# Patient Record
Sex: MALE | Race: WHITE | NOT HISPANIC OR LATINO | URBAN - METROPOLITAN AREA
[De-identification: names, ages, dates, MRNs, and addresses within clinical notes are randomized per-mention and may not be internally consistent; named-entity substitution may affect disease eponyms.]

---

## 2018-07-09 ENCOUNTER — OUTPATIENT (OUTPATIENT)
Dept: OUTPATIENT SERVICES | Facility: HOSPITAL | Age: 73
LOS: 1 days | End: 2018-07-09

## 2018-07-16 ENCOUNTER — OUTPATIENT (OUTPATIENT)
Dept: OUTPATIENT SERVICES | Facility: HOSPITAL | Age: 73
LOS: 1 days | End: 2018-07-16

## 2018-07-16 ENCOUNTER — INPATIENT (INPATIENT)
Facility: HOSPITAL | Age: 73
LOS: 1 days | Discharge: HOME | End: 2018-07-18
Payer: MEDICARE

## 2018-07-16 PROCEDURE — 72170 X-RAY EXAM OF PELVIS: CPT | Mod: 26

## 2018-07-16 PROCEDURE — 72170 X-RAY EXAM OF PELVIS: CPT | Mod: 26,77

## 2018-07-17 ENCOUNTER — OUTPATIENT (OUTPATIENT)
Dept: OUTPATIENT SERVICES | Facility: HOSPITAL | Age: 73
LOS: 1 days | End: 2018-07-17

## 2018-07-18 ENCOUNTER — OUTPATIENT (OUTPATIENT)
Dept: OUTPATIENT SERVICES | Facility: HOSPITAL | Age: 73
LOS: 1 days | End: 2018-07-18

## 2021-03-22 ENCOUNTER — OUTPATIENT (OUTPATIENT)
Dept: OUTPATIENT SERVICES | Facility: HOSPITAL | Age: 76
LOS: 1 days | End: 2021-03-22
Payer: MEDICARE

## 2021-03-22 PROCEDURE — 93010 ELECTROCARDIOGRAM REPORT: CPT

## 2021-04-02 ENCOUNTER — APPOINTMENT (OUTPATIENT)
Dept: DISASTER EMERGENCY | Facility: CLINIC | Age: 76
End: 2021-04-02

## 2021-04-02 DIAGNOSIS — Z01.818 ENCOUNTER FOR OTHER PREPROCEDURAL EXAMINATION: ICD-10-CM

## 2021-04-02 PROBLEM — Z00.00 ENCOUNTER FOR PREVENTIVE HEALTH EXAMINATION: Status: ACTIVE | Noted: 2021-04-02

## 2021-04-03 LAB — SARS-COV-2 N GENE NPH QL NAA+PROBE: NOT DETECTED

## 2021-04-05 ENCOUNTER — INPATIENT (INPATIENT)
Facility: HOSPITAL | Age: 76
LOS: 1 days | Discharge: HOME HEALTH | End: 2021-04-07

## 2021-04-05 ENCOUNTER — OUTPATIENT (OUTPATIENT)
Dept: OUTPATIENT SERVICES | Facility: HOSPITAL | Age: 76
LOS: 1 days | End: 2021-04-05

## 2021-04-06 ENCOUNTER — OUTPATIENT (OUTPATIENT)
Dept: OUTPATIENT SERVICES | Facility: HOSPITAL | Age: 76
LOS: 1 days | End: 2021-04-06

## 2021-04-07 ENCOUNTER — OUTPATIENT (OUTPATIENT)
Dept: OUTPATIENT SERVICES | Facility: HOSPITAL | Age: 76
LOS: 1 days | End: 2021-04-07

## 2023-09-15 ASSESSMENT — HOOS JR
HOOS JR RAW SCORE: 6
IMPORTED LATERALITY: RIGHT
GOING UP OR DOWN STAIRS: MILD
IMPORTED HOOS JR SCORE: 70.43
HOOS JR RAW SCORE: 6
GOING UP OR DOWN STAIRS: MILD
LYING IN BED (TURNING OVER, MAINTAINING HIP POSITION): MILD
RISING FROM SITTING: MILD
WALKING ON UNEVEN SURFACE: MODERATE
WALKING ON UNEVEN SURFACE: MODERATE
GOING UP OR DOWN STAIRS: MILD
RISING FROM SITTING: MILD
IMPORTED HOOS JR SCORE: 85.26
BENDING TO THE FLOOR TO PICK UP OBJECT: MILD
IMPORTED FORM: YES
WALKING ON UNEVEN SURFACE: MILD
IMPORTED HOOS JR SCORE: 70.43
LYING IN BED (TURNING OVER, MAINTAINING HIP POSITION): MILD
BENDING TO THE FLOOR TO PICK UP OBJECT: MILD
IMPORTED FORM: YES
WALKING ON UNEVEN SURFACE: MILD
IMPORTED FORM: YES
IMPORTED HOOS JR SCORE: 85.26
HOOS JR RAW SCORE: 2
HOOS JR RAW SCORE: 2
IMPORTED FORM: YES
IMPORTED LATERALITY: RIGHT
GOING UP OR DOWN STAIRS: MILD
IMPORTED LATERALITY: RIGHT
IMPORTED LATERALITY: RIGHT

## 2024-01-24 ENCOUNTER — OFFICE (OUTPATIENT)
Dept: URBAN - METROPOLITAN AREA CLINIC 8 | Facility: CLINIC | Age: 79
Setting detail: OPHTHALMOLOGY
End: 2024-01-24
Payer: MEDICARE

## 2024-01-24 DIAGNOSIS — H40.1131: ICD-10-CM

## 2024-01-24 DIAGNOSIS — H35.361: ICD-10-CM

## 2024-01-24 DIAGNOSIS — H11.153: ICD-10-CM

## 2024-01-24 DIAGNOSIS — H25.13: ICD-10-CM

## 2024-01-24 PROCEDURE — 92134 CPTRZ OPH DX IMG PST SGM RTA: CPT | Performed by: OPHTHALMOLOGY

## 2024-01-24 PROCEDURE — 99214 OFFICE O/P EST MOD 30 MIN: CPT | Performed by: OPHTHALMOLOGY

## 2024-01-24 ASSESSMENT — REFRACTION_CURRENTRX
OS_VPRISM_DIRECTION: SV
OS_OVR_VA: 20/
OD_CYLINDER: -2.50
OS_AXIS: 068
OD_OVR_VA: 20/
OD_OVR_VA: 20/
OS_SPHERE: -2.50
OD_AXIS: 094
OS_CYLINDER: -1.00
OS_OVR_VA: 20/
OD_VPRISM_DIRECTION: PROGS
OD_AXIS: 096
OS_SPHERE: -3.00
OS_AXIS: 085
OD_SPHERE: -1.50
OD_VPRISM_DIRECTION: SV
OD_CYLINDER: -1.25
OS_ADD: +2.75
OS_CYLINDER: -1.25
OD_ADD: +2.25
OD_SPHERE: -0.75
OS_VPRISM_DIRECTION: PROGS

## 2024-01-24 ASSESSMENT — REFRACTION_MANIFEST
OS_ADD: +2.50
OU_VA: 20/40
OD_AXIS: 100
OD_CYLINDER: -2.50
OD_SPHERE: -0.75
OD_VA1: 20/40
OS_SPHERE: -2.50
OD_ADD: +2.50
OD_VA2: 20/20(J1+)
OS_CYLINDER: -1.25
OS_VA2: 20/20(J1+)
OS_VA1: 20/40
OS_AXIS: 075

## 2024-01-24 ASSESSMENT — REFRACTION_AUTOREFRACTION
OD_SPHERE: PLANO
OS_SPHERE: -2.25
OS_AXIS: 064
OS_CYLINDER: -1.00
OD_CYLINDER: -2.25
OD_AXIS: 096

## 2024-01-24 ASSESSMENT — CONFRONTATIONAL VISUAL FIELD TEST (CVF)
OS_FINDINGS: FULL
OD_FINDINGS: FULL

## 2024-01-24 ASSESSMENT — LID POSITION - DERMATOCHALASIS
OD_DERMATOCHALASIS: T
OS_DERMATOCHALASIS: T

## 2024-01-24 ASSESSMENT — SPHEQUIV_DERIVED
OS_SPHEQUIV: -2.75
OS_SPHEQUIV: -3.125
OD_SPHEQUIV: -2

## 2024-02-26 ENCOUNTER — OFFICE (OUTPATIENT)
Dept: URBAN - METROPOLITAN AREA CLINIC 8 | Facility: CLINIC | Age: 79
Setting detail: OPHTHALMOLOGY
End: 2024-02-26
Payer: MEDICARE

## 2024-02-26 DIAGNOSIS — H25.13: ICD-10-CM

## 2024-02-26 DIAGNOSIS — H25.11: ICD-10-CM

## 2024-02-26 PROCEDURE — 92136 OPHTHALMIC BIOMETRY: CPT | Mod: TC | Performed by: OPHTHALMOLOGY

## 2024-02-26 PROCEDURE — 92136 OPHTHALMIC BIOMETRY: CPT | Mod: 26,RT | Performed by: OPHTHALMOLOGY

## 2024-02-26 PROCEDURE — 99213 OFFICE O/P EST LOW 20 MIN: CPT | Performed by: OPHTHALMOLOGY

## 2024-02-26 ASSESSMENT — SPHEQUIV_DERIVED
OD_SPHEQUIV: -2
OS_SPHEQUIV: -2.75
OS_SPHEQUIV: -3.125

## 2024-02-26 ASSESSMENT — REFRACTION_CURRENTRX
OD_AXIS: 096
OD_CYLINDER: -2.50
OD_SPHERE: -1.50
OS_AXIS: 085
OD_CYLINDER: -1.25
OS_AXIS: 068
OS_CYLINDER: -1.00
OS_SPHERE: -3.00
OS_OVR_VA: 20/
OS_SPHERE: -2.50
OS_OVR_VA: 20/
OD_VPRISM_DIRECTION: PROGS
OS_CYLINDER: -1.25
OS_VPRISM_DIRECTION: PROGS
OD_AXIS: 094
OD_OVR_VA: 20/
OD_SPHERE: -0.75
OS_VPRISM_DIRECTION: SV
OD_OVR_VA: 20/
OD_ADD: +2.25
OD_VPRISM_DIRECTION: SV
OS_ADD: +2.75

## 2024-02-26 ASSESSMENT — REFRACTION_MANIFEST
OD_CYLINDER: -2.50
OU_VA: 20/40
OS_CYLINDER: -1.25
OS_AXIS: 075
OS_VA2: 20/20(J1+)
OS_ADD: +2.50
OS_SPHERE: -2.50
OS_VA1: 20/40
OD_VA2: 20/20(J1+)
OD_VA1: 20/40
OD_ADD: +2.50
OD_AXIS: 100
OD_SPHERE: -0.75

## 2024-02-26 ASSESSMENT — REFRACTION_AUTOREFRACTION
OS_AXIS: 064
OS_CYLINDER: -1.00
OD_AXIS: 096
OD_SPHERE: PLANO
OD_CYLINDER: -2.25
OS_SPHERE: -2.25

## 2024-02-26 ASSESSMENT — LID POSITION - DERMATOCHALASIS
OD_DERMATOCHALASIS: T
OS_DERMATOCHALASIS: T

## 2024-02-26 ASSESSMENT — CONFRONTATIONAL VISUAL FIELD TEST (CVF)
OD_FINDINGS: FULL
OS_FINDINGS: FULL

## 2024-04-09 ENCOUNTER — AMBULATORY SURGERY CENTER (OUTPATIENT)
Dept: URBAN - METROPOLITAN AREA SURGERY 4 | Facility: SURGERY | Age: 79
Setting detail: OPHTHALMOLOGY
End: 2024-04-09
Payer: MEDICARE

## 2024-04-09 DIAGNOSIS — H25.11: ICD-10-CM

## 2024-04-09 DIAGNOSIS — H52.211: ICD-10-CM

## 2024-04-09 DIAGNOSIS — H40.1131: ICD-10-CM

## 2024-04-09 PROCEDURE — V2788LAL LIGHT ADJUSTABLE LENS (LAL): Performed by: OPHTHALMOLOGY

## 2024-04-09 PROCEDURE — 66991 XCAPSL CTRC RMVL INSJ 1+: CPT | Mod: 54,RT | Performed by: OPHTHALMOLOGY

## 2024-04-09 PROCEDURE — A9270 NON-COVERED ITEM OR SERVICE: HCPCS | Mod: GY | Performed by: OPHTHALMOLOGY

## 2024-04-09 PROCEDURE — FEMTO FEMTOSECOND LASER: Mod: GY | Performed by: OPHTHALMOLOGY

## 2024-04-10 ENCOUNTER — RX ONLY (RX ONLY)
Age: 79
End: 2024-04-10

## 2024-04-10 ENCOUNTER — OFFICE (OUTPATIENT)
Dept: URBAN - METROPOLITAN AREA CLINIC 8 | Facility: CLINIC | Age: 79
Setting detail: OPHTHALMOLOGY
End: 2024-04-10
Payer: MEDICARE

## 2024-04-10 DIAGNOSIS — H25.12: ICD-10-CM

## 2024-04-10 PROCEDURE — 92136 OPHTHALMIC BIOMETRY: CPT | Mod: 26,LT | Performed by: OPHTHALMOLOGY

## 2024-04-10 ASSESSMENT — LID POSITION - DERMATOCHALASIS
OS_DERMATOCHALASIS: T
OD_DERMATOCHALASIS: T

## 2024-04-16 ENCOUNTER — OUTPATIENT HOSPITAL (OUTPATIENT)
Dept: URBAN - METROPOLITAN AREA CLINIC 7 | Facility: CLINIC | Age: 79
Setting detail: OPHTHALMOLOGY
End: 2024-04-16
Payer: MEDICARE

## 2024-04-16 DIAGNOSIS — H52.212: ICD-10-CM

## 2024-04-16 DIAGNOSIS — H40.1131: ICD-10-CM

## 2024-04-16 DIAGNOSIS — H25.12: ICD-10-CM

## 2024-04-16 PROCEDURE — FEMTO FEMTOSECOND LASER: Mod: GY | Performed by: OPHTHALMOLOGY

## 2024-04-16 PROCEDURE — V2788LAL LIGHT ADJUSTABLE LENS (LAL): Performed by: OPHTHALMOLOGY

## 2024-04-16 PROCEDURE — 66991 XCAPSL CTRC RMVL INSJ 1+: CPT | Mod: 54,79,LT | Performed by: OPHTHALMOLOGY

## 2024-04-16 PROCEDURE — A9270 NON-COVERED ITEM OR SERVICE: HCPCS | Mod: GY | Performed by: OPHTHALMOLOGY

## 2024-04-17 ENCOUNTER — OFFICE (OUTPATIENT)
Dept: URBAN - METROPOLITAN AREA CLINIC 8 | Facility: CLINIC | Age: 79
Setting detail: OPHTHALMOLOGY
End: 2024-04-17
Payer: MEDICARE

## 2024-04-17 DIAGNOSIS — Z96.1: ICD-10-CM

## 2024-04-17 DIAGNOSIS — H11.32: ICD-10-CM

## 2024-04-17 DIAGNOSIS — H40.1131: ICD-10-CM

## 2024-04-17 PROCEDURE — 99024 POSTOP FOLLOW-UP VISIT: CPT | Performed by: OPHTHALMOLOGY

## 2024-04-17 ASSESSMENT — LID POSITION - DERMATOCHALASIS
OD_DERMATOCHALASIS: T
OS_DERMATOCHALASIS: T

## 2024-09-06 ENCOUNTER — APPOINTMENT (OUTPATIENT)
Dept: ORTHOPEDIC SURGERY | Facility: CLINIC | Age: 79
End: 2024-09-06
Payer: MEDICARE

## 2024-09-06 DIAGNOSIS — S83.242A OTHER TEAR OF MEDIAL MENISCUS, CURRENT INJURY, LEFT KNEE, INITIAL ENCOUNTER: ICD-10-CM

## 2024-09-06 PROCEDURE — 99203 OFFICE O/P NEW LOW 30 MIN: CPT

## 2024-09-06 RX ORDER — SIMVASTATIN 5 MG/1
5 TABLET, FILM COATED ORAL
Refills: 0 | Status: ACTIVE | COMMUNITY

## 2024-09-06 RX ORDER — VIT B12/LEVOMEFOLATE/VIT B6/B2 1-6-50-5MG
TABLET ORAL
Refills: 0 | Status: ACTIVE | COMMUNITY

## 2024-09-06 RX ORDER — SERTRALINE 25 MG/1
TABLET, FILM COATED ORAL
Refills: 0 | Status: ACTIVE | COMMUNITY

## 2024-09-06 NOTE — PHYSICAL EXAM
[Left] : left knee [NL (0)] : extension 0 degrees [5___] : hamstring 5[unfilled]/5 [Negative] : negative Marsha's [] : patient ambulates without assistive device [AP] : anteroposterior [Lateral] : lateral [[place]] : skyline [Outside films reviewed] : Outside films reviewed [Degenerative change] : Degenerative change [TWNoteComboBox7] : flexion 120 degrees

## 2024-09-06 NOTE — HISTORY OF PRESENT ILLNESS
[de-identified] :  Patient presents for evaluation on LT knee pain. Patient reports knee pain starting around April without known trauma.  He saw his PCP in June who ordered XRs and prescribed PT.  Given continued swelling and pain, he returned to PCP who ordered MRI in August and diagnosed him with meniscus tear, advised seeing orthopedist.  Patient is taking Advil as needed with relief.

## 2024-09-06 NOTE — DISCUSSION/SUMMARY
[de-identified] : I reviewed patient's left knee radiographs and MRI and discussed his condition and treatment options.  Defer injection.  Start PT and HEP.  Follow up in 4 weeks.  Patient voiced understanding and agreement with the plan.

## 2024-10-02 ENCOUNTER — OFFICE (OUTPATIENT)
Dept: URBAN - METROPOLITAN AREA CLINIC 8 | Facility: CLINIC | Age: 79
Setting detail: OPHTHALMOLOGY
End: 2024-10-02
Payer: MEDICARE

## 2024-10-02 ENCOUNTER — APPOINTMENT (OUTPATIENT)
Dept: ORTHOPEDIC SURGERY | Facility: CLINIC | Age: 79
End: 2024-10-02
Payer: MEDICARE

## 2024-10-02 DIAGNOSIS — Z96.1: ICD-10-CM

## 2024-10-02 DIAGNOSIS — H35.3131: ICD-10-CM

## 2024-10-02 DIAGNOSIS — H52.4: ICD-10-CM

## 2024-10-02 DIAGNOSIS — H40.1131: ICD-10-CM

## 2024-10-02 DIAGNOSIS — H26.492: ICD-10-CM

## 2024-10-02 DIAGNOSIS — S83.242A OTHER TEAR OF MEDIAL MENISCUS, CURRENT INJURY, LEFT KNEE, INITIAL ENCOUNTER: ICD-10-CM

## 2024-10-02 DIAGNOSIS — H11.153: ICD-10-CM

## 2024-10-02 PROCEDURE — 99212 OFFICE O/P EST SF 10 MIN: CPT

## 2024-10-02 PROCEDURE — 92133 CPTRZD OPH DX IMG PST SGM ON: CPT | Performed by: OPHTHALMOLOGY

## 2024-10-02 PROCEDURE — 99213 OFFICE O/P EST LOW 20 MIN: CPT | Performed by: OPHTHALMOLOGY

## 2024-10-02 PROCEDURE — 92015 DETERMINE REFRACTIVE STATE: CPT | Performed by: OPHTHALMOLOGY

## 2024-10-02 ASSESSMENT — REFRACTION_MANIFEST
OS_SPHERE: -2.50
OS_AXIS: 075
OD_VA2: 20/20(J1+)
OU_VA: 20/40
OD_VA1: 20/40
OS_SPHERE: -1.25
OS_VA2: 20/20(J1+)
OS_ADD: +2.50
OS_ADD: +1.75
OD_CYLINDER: -2.50
OD_SPHERE: -0.25
OS_VA1: 20/40
OS_CYLINDER: -1.25
OD_AXIS: 100
OD_ADD: +2.50
OD_SPHERE: -0.75
OD_ADD: +1.75
OS_CYLINDER: SPH
OD_CYLINDER: SPH

## 2024-10-02 ASSESSMENT — REFRACTION_CURRENTRX
OD_CYLINDER: -2.50
OS_OVR_VA: 20/
OS_VPRISM_DIRECTION: PROGS
OD_SPHERE: -1.50
OD_OVR_VA: 20/
OS_SPHERE: -2.50
OS_AXIS: 085
OS_CYLINDER: -1.25
OS_CYLINDER: -1.00
OS_AXIS: 068
OD_VPRISM_DIRECTION: SV
OD_AXIS: 096
OD_VPRISM_DIRECTION: PROGS
OS_VPRISM_DIRECTION: SV
OD_SPHERE: -0.75
OD_OVR_VA: 20/
OD_ADD: +2.25
OS_SPHERE: -3.00
OD_AXIS: 094
OS_ADD: +2.75
OD_CYLINDER: -1.25
OS_OVR_VA: 20/

## 2024-10-02 ASSESSMENT — REFRACTION_AUTOREFRACTION
OD_AXIS: 127
OS_SPHERE: -1.50
OS_AXIS: 072
OD_SPHERE: 0.00
OD_CYLINDER: -0.25
OS_CYLINDER: -0.25

## 2024-10-02 ASSESSMENT — VISUAL ACUITY
OS_BCVA: 20/25-2
OD_BCVA: 20/40-2

## 2024-10-02 ASSESSMENT — KERATOMETRY
OD_K1POWER_DIOPTERS: 42.75
METHOD_AUTO_MANUAL: AUTO
OD_AXISANGLE_DEGREES: 090
OS_AXISANGLE_DEGREES: 093
OS_K1POWER_DIOPTERS: 42.75
OS_K2POWER_DIOPTERS: 43.25
OD_K2POWER_DIOPTERS: 42.75

## 2024-10-02 ASSESSMENT — PACHYMETRY
OD_CT_CORRECTION: 4
OD_CT_UM: 499
OS_CT_UM: 494
OS_CT_CORRECTION: 4

## 2024-10-02 ASSESSMENT — LID POSITION - DERMATOCHALASIS
OD_DERMATOCHALASIS: T
OS_DERMATOCHALASIS: T

## 2024-10-02 ASSESSMENT — CONFRONTATIONAL VISUAL FIELD TEST (CVF)
OD_FINDINGS: FULL
OS_FINDINGS: FULL

## 2024-10-02 ASSESSMENT — TONOMETRY
OS_IOP_MMHG: 13
OD_IOP_MMHG: 13

## 2024-10-02 NOTE — PHYSICAL EXAM
[Left] : left knee [NL (0)] : extension 0 degrees [5___] : hamstring 5[unfilled]/5 [Negative] : negative Marsha's [] : patient ambulates without assistive device [AP] : anteroposterior [Lateral] : lateral [[place]] : skyline [Outside films reviewed] : Outside films reviewed [Degenerative change] : Degenerative change [TWNoteComboBox7] : flexion 120 degrees

## 2024-10-02 NOTE — DISCUSSION/SUMMARY
[de-identified] : I discussed his condition and treatment course.  Continue PT and HEP.  Defer injection.  Follow up as needed.  Patient voiced understanding and agreement with the plan.

## 2025-04-09 ENCOUNTER — OFFICE (OUTPATIENT)
Dept: URBAN - METROPOLITAN AREA CLINIC 8 | Facility: CLINIC | Age: 80
Setting detail: OPHTHALMOLOGY
End: 2025-04-09
Payer: MEDICARE

## 2025-04-09 ENCOUNTER — RX ONLY (RX ONLY)
Age: 80
End: 2025-04-09

## 2025-04-09 DIAGNOSIS — H26.492: ICD-10-CM

## 2025-04-09 PROCEDURE — 66821 AFTER CATARACT LASER SURGERY: CPT | Mod: LT | Performed by: OPHTHALMOLOGY

## 2025-04-09 ASSESSMENT — REFRACTION_MANIFEST
OU_VA: 20/40
OD_VA2: 20/20(J1+)
OD_AXIS: 128
OS_ADD: +1.75
OS_CYLINDER: SPH
OD_CYLINDER: -0.25
OS_CYLINDER: SPH
OS_VA1: 20/40
OS_AXIS: `
OD_CYLINDER: SPH
OS_ADD: +1.75
OD_SPHERE: -0.25
OD_ADD: +1.75
OD_ADD: +1.75
OS_VA1: 20/40+
OS_SPHERE: -1.25
OS_SPHERE: -1.75
OD_SPHERE: -0.25
OS_VA2: 20/20(J1+)
OD_VA1: 20/25

## 2025-04-09 ASSESSMENT — REFRACTION_CURRENTRX
OS_SPHERE: +0.50
OS_VPRISM_DIRECTION: PROGS
OD_OVR_VA: 20/
OS_OVR_VA: 20/
OD_OVR_VA: 20/
OD_OVR_VA: 20/
OS_CYLINDER: -1.25
OS_AXIS: 085
OS_SPHERE: -2.50
OD_CYLINDER: -1.25
OD_SPHERE: +1.50
OS_VPRISM_DIRECTION: SV
OS_SPHERE: -3.00
OD_SPHERE: -1.50
OD_VPRISM_DIRECTION: PROGS
OD_AXIS: 096
OD_VPRISM_DIRECTION: SV
OD_CYLINDER: -2.50
OD_ADD: +2.25
OD_CYLINDER: SPH
OD_VPRISM_DIRECTION: SV
OS_OVR_VA: 20/
OS_CYLINDER: -1.00
OS_OVR_VA: 20/
OS_ADD: +2.75
OS_AXIS: 068
OD_AXIS: 094
OS_CYLINDER: SPH
OD_SPHERE: -0.75
OS_VPRISM_DIRECTION: SV

## 2025-04-09 ASSESSMENT — VISUAL ACUITY
OD_BCVA: 20/40-
OS_BCVA: 20/25-2

## 2025-04-09 ASSESSMENT — CONFRONTATIONAL VISUAL FIELD TEST (CVF)
OD_FINDINGS: FULL
OS_FINDINGS: FULL

## 2025-04-09 ASSESSMENT — REFRACTION_AUTOREFRACTION
OD_SPHERE: -0.25
OD_AXIS: 128
OS_CYLINDER: -0.25
OS_AXIS: 170
OD_CYLINDER: -0.25
OS_SPHERE: -2.00

## 2025-04-09 ASSESSMENT — KERATOMETRY
OS_AXISANGLE_DEGREES: 082
METHOD_AUTO_MANUAL: AUTO
OS_K1POWER_DIOPTERS: 42.75
OD_AXISANGLE_DEGREES: 090
OD_K1POWER_DIOPTERS: 42.50
OS_K2POWER_DIOPTERS: 43.25
OD_K2POWER_DIOPTERS: 42.50

## 2025-04-09 ASSESSMENT — PACHYMETRY
OS_CT_CORRECTION: 4
OD_CT_UM: 499
OD_CT_CORRECTION: 4
OS_CT_UM: 494

## 2025-04-09 ASSESSMENT — TONOMETRY
OD_IOP_MMHG: 13
OS_IOP_MMHG: 13

## 2025-04-09 ASSESSMENT — LID POSITION - DERMATOCHALASIS
OS_DERMATOCHALASIS: T
OD_DERMATOCHALASIS: T

## 2025-04-30 ENCOUNTER — OFFICE (OUTPATIENT)
Dept: URBAN - METROPOLITAN AREA CLINIC 8 | Facility: CLINIC | Age: 80
Setting detail: OPHTHALMOLOGY
End: 2025-04-30
Payer: MEDICARE

## 2025-04-30 DIAGNOSIS — H26.493: ICD-10-CM

## 2025-04-30 PROCEDURE — 99024 POSTOP FOLLOW-UP VISIT: CPT | Performed by: OPHTHALMOLOGY

## 2025-04-30 ASSESSMENT — REFRACTION_MANIFEST
OD_ADD: +1.75
OS_ADD: +1.75
OD_ADD: +3.00
OS_AXIS: 080
OD_SPHERE: -0.25
OD_VA1: 20/25
OS_CYLINDER: -0.50
OD_VA2: 20/20(J1+)
OD_CYLINDER: -0.25
OD_SPHERE: -0.25
OS_VA2: 20/20(J1+)
OS_VA1: 20/40+2
OD_CYLINDER: SPH
OD_VA1: 20/25-
OD_AXIS: 120
OS_AXIS: `
OS_SPHERE: -1.25
OS_VA1: 20/40+
OS_ADD: +3.00
OS_CYLINDER: SPH
OU_VA: 20/25-2
OS_SPHERE: -1.75

## 2025-04-30 ASSESSMENT — REFRACTION_CURRENTRX
OS_CYLINDER: -1.00
OD_AXIS: 094
OS_OVR_VA: 20/
OD_ADD: +2.25
OS_OVR_VA: 20/
OD_OVR_VA: 20/
OD_VPRISM_DIRECTION: SV
OD_CYLINDER: SPH
OS_CYLINDER: SPH
OS_SPHERE: +0.50
OD_SPHERE: -0.75
OS_VPRISM_DIRECTION: PROGS
OD_VPRISM_DIRECTION: PROGS
OD_OVR_VA: 20/
OS_AXIS: 068
OS_ADD: +2.75
OD_SPHERE: -1.50
OS_SPHERE: -3.00
OD_OVR_VA: 20/
OD_AXIS: 096
OD_CYLINDER: -1.25
OD_CYLINDER: -2.50
OS_VPRISM_DIRECTION: SV
OS_VPRISM_DIRECTION: SV
OS_OVR_VA: 20/
OS_SPHERE: -2.50
OS_CYLINDER: -1.25
OD_VPRISM_DIRECTION: SV
OS_AXIS: 085
OD_SPHERE: +1.50

## 2025-04-30 ASSESSMENT — TONOMETRY
OD_IOP_MMHG: 12
OS_IOP_MMHG: 12

## 2025-04-30 ASSESSMENT — CONFRONTATIONAL VISUAL FIELD TEST (CVF)
OD_FINDINGS: FULL
OS_FINDINGS: FULL

## 2025-04-30 ASSESSMENT — VISUAL ACUITY
OS_BCVA: 20/25-
OD_BCVA: 20/50-

## 2025-04-30 ASSESSMENT — PACHYMETRY
OD_CT_UM: 499
OD_CT_CORRECTION: 4
OS_CT_CORRECTION: 4
OS_CT_UM: 494